# Patient Record
Sex: MALE | Race: WHITE | URBAN - METROPOLITAN AREA
[De-identification: names, ages, dates, MRNs, and addresses within clinical notes are randomized per-mention and may not be internally consistent; named-entity substitution may affect disease eponyms.]

---

## 2018-06-12 ENCOUNTER — OFFICE VISIT (OUTPATIENT)
Dept: URBAN - METROPOLITAN AREA CLINIC 76 | Facility: CLINIC | Age: 68
End: 2018-06-12
Payer: MEDICARE

## 2018-06-12 DIAGNOSIS — E11.3212 TYPE 2 DIAB W MILD NONPRLF DIABETIC RTNOP W MACULAR EDEMA, LEFT EYE: ICD-10-CM

## 2018-06-12 DIAGNOSIS — H35.373 PUCKERING OF MACULA, BILATERAL: ICD-10-CM

## 2018-06-12 PROCEDURE — 92012 INTRM OPH EXAM EST PATIENT: CPT | Performed by: OPHTHALMOLOGY

## 2018-06-12 PROCEDURE — 92134 CPTRZ OPH DX IMG PST SGM RTA: CPT | Performed by: OPHTHALMOLOGY

## 2018-06-12 ASSESSMENT — INTRAOCULAR PRESSURE
OS: 14
OD: 13

## 2018-06-12 ASSESSMENT — VISUAL ACUITY
OD: 20/40
OS: 20/20

## 2018-06-12 NOTE — IMPRESSION/PLAN
Impression: Diplopia: H53.2. no 4th nerve palsy seen today. ? noticing vision issues from cataract/erm OD. ? 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 No prism needed at this time. Recommend followup assessment after combined surgery. Plan: Discussed with patient. continue to monitor.

## 2018-06-12 NOTE — IMPRESSION/PLAN
Impression: Type 2 diab w mild nonprlf diabetic rtnop w macular edema, left eye: Y10.1652. OS. Plan: Mild NPDR, no signs of neovascularization noted. No treatment necessary at this time. Patient was instructed to monitor vision for sudden changes and to call if visual changes noted. Discussed ocular and systemic benefits of blood sugar control.

## 2018-06-13 NOTE — IMPRESSION/PLAN
Impression: Puckering of macula, bilateral: H35.373. OU.
S/P ERM peeL OS (02/26/18). Pt noticing continued metamorphopsia OS. OCT OS stable at this point. Followup with Dr. Carlyle Archer as originally planned prior to combined surgery OD. Plan: Continue to monitor.  recommend patient follow-up with Dr Hema Rivera prior to Combined Sx on 07/02/18

## 2018-06-18 ENCOUNTER — OFFICE VISIT (OUTPATIENT)
Dept: URBAN - METROPOLITAN AREA CLINIC 76 | Facility: CLINIC | Age: 68
End: 2018-06-18
Payer: MEDICARE

## 2018-06-18 DIAGNOSIS — H35.372 PUCKERING OF MACULA, LEFT EYE: ICD-10-CM

## 2018-06-18 PROCEDURE — 92134 CPTRZ OPH DX IMG PST SGM RTA: CPT | Performed by: OPHTHALMOLOGY

## 2018-06-18 PROCEDURE — 99213 OFFICE O/P EST LOW 20 MIN: CPT | Performed by: OPHTHALMOLOGY

## 2018-06-18 ASSESSMENT — INTRAOCULAR PRESSURE
OD: 11
OS: 12

## 2018-06-18 NOTE — IMPRESSION/PLAN
Impression: Puckering of macula, left eye: H35.372. S/P membrane peel OS 2/26/2018 Plan: Discussed with Patient there is mild fibrosis surrounding his retina. I recommend observation at this time and will re eval in 2 months. Patient agrees with plan and will call if vision worsens.

## 2018-06-18 NOTE — IMPRESSION/PLAN
Impression: Puckering of macula, right eye: H35.371. Plan: OCT ordered and performed today. Discussed diagnosis with patient. The clinical exam was consistent with Epiretinal membrane. The diagnosis and natural history and prognosis of Epiretinal membrane, as well as the risks and benefits with Vitrectomy with membrane peeling were discussed. Given the current visual acuity the patient elects to observe for now. Discussed with patient to go ahead with plan to have Cataract sx in the right eye. No retina contraindication.

## 2018-08-17 ENCOUNTER — OFFICE VISIT (OUTPATIENT)
Dept: URBAN - METROPOLITAN AREA CLINIC 76 | Facility: CLINIC | Age: 68
End: 2018-08-17
Payer: MEDICARE

## 2018-08-17 DIAGNOSIS — H53.2 DIPLOPIA: ICD-10-CM

## 2018-08-17 PROCEDURE — 99213 OFFICE O/P EST LOW 20 MIN: CPT | Performed by: OPHTHALMOLOGY

## 2018-08-17 PROCEDURE — 92134 CPTRZ OPH DX IMG PST SGM RTA: CPT | Performed by: OPHTHALMOLOGY

## 2018-08-17 ASSESSMENT — INTRAOCULAR PRESSURE
OS: 14
OD: 13

## 2018-08-17 NOTE — IMPRESSION/PLAN
Impression: Puckering of macula, left eye: H35.372. S/P membrane peel OS 2/26/2018 Plan: Discussed with Patient there is mild fibrosis surrounding his retina. I recommend observation at this time and will re eval in 3 months. Patient agrees with plan and will call if vision worsens.

## 2018-08-17 NOTE — IMPRESSION/PLAN
Impression: Puckering of macula, right eye: H35.371. Plan: OCT ordered and performed today. Discussed diagnosis with patient. The clinical exam was consistent with Epiretinal membrane. The diagnosis and natural history and prognosis of Epiretinal membrane, as well as the risks and benefits with Vitrectomy with membrane peeling were discussed. Given the current visual acuity no sx needed at this time. Recommend observation,  Discussed with patient to go ahead with plan to have Cataract sx in the right eye. No retina contraindication.

## 2018-11-19 ENCOUNTER — OFFICE VISIT (OUTPATIENT)
Dept: URBAN - METROPOLITAN AREA CLINIC 76 | Facility: CLINIC | Age: 68
End: 2018-11-19
Payer: MEDICARE

## 2018-11-19 DIAGNOSIS — H35.371 PUCKERING OF MACULA, RIGHT EYE: Primary | ICD-10-CM

## 2018-11-19 DIAGNOSIS — E11.3552 TYPE 2 DIABETES WITH STABLE PROLIF DIABETIC RTNOP, LEFT EYE: ICD-10-CM

## 2018-11-19 PROCEDURE — 92134 CPTRZ OPH DX IMG PST SGM RTA: CPT | Performed by: OPHTHALMOLOGY

## 2018-11-19 PROCEDURE — 99213 OFFICE O/P EST LOW 20 MIN: CPT | Performed by: OPHTHALMOLOGY

## 2018-11-19 ASSESSMENT — INTRAOCULAR PRESSURE
OD: 13
OS: 19

## 2018-11-19 NOTE — IMPRESSION/PLAN
Impression: Puckering of macula, right eye: H35.371.
s/p PPVx  [facility] 2/26/18 OS Plan: OCT ordered and performed today. Discussed diagnosis with patient. The clinical exam was consistent with Epiretinal membrane. The diagnosis and natural history and prognosis of Epiretinal membrane, as well as the risks and benefits with Vitrectomy with membrane peeling were discussed. Given the current visual acuity the patient elects to observe for now. Discussed with patient to go ahead with plan to have Cataract sx in the right eye. No retina contraindication.

## 2018-11-19 NOTE — IMPRESSION/PLAN
Impression: Type 2 diabetes with stable prolif diabetic rtnop, left eye: O60.1898. Plan:  The clinical exam is consistent with proliferative diabetic retinopathy. Discussed diagnosis with patient. Recommend close observation at this time. Discussed risk of progression with the present condition. The patient was advised to maintain tight blood sugar control, blood pressure and lipid control. Patient agrees with plan.

## 2018-12-04 ENCOUNTER — TESTING ONLY (OUTPATIENT)
Dept: URBAN - METROPOLITAN AREA CLINIC 76 | Facility: CLINIC | Age: 68
End: 2018-12-04
Payer: MEDICARE

## 2018-12-04 DIAGNOSIS — H52.4 PRESBYOPIA: Primary | ICD-10-CM

## 2018-12-04 DIAGNOSIS — H50.21 HYPERTROPIA OF RIGHT EYE: ICD-10-CM

## 2018-12-04 ASSESSMENT — KERATOMETRY
OD: 43.50
OS: 43.13

## 2018-12-04 ASSESSMENT — VISUAL ACUITY
OS: 20/30
OD: 20/40

## 2018-12-04 NOTE — IMPRESSION/PLAN
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 1-2 PD. Diplopia noticed about 2 months after combined surgery (2/2018). Seems permanent now. Plan: 1 pd BU prism OS. Explained dependency on prism. Call with conerns. Explained importance of controlling BP and DM. Pt reports neuropathy.